# Patient Record
Sex: MALE | Race: WHITE | NOT HISPANIC OR LATINO | Employment: FULL TIME | URBAN - METROPOLITAN AREA
[De-identification: names, ages, dates, MRNs, and addresses within clinical notes are randomized per-mention and may not be internally consistent; named-entity substitution may affect disease eponyms.]

---

## 2019-04-11 ENCOUNTER — OFFICE VISIT (OUTPATIENT)
Dept: URGENT CARE | Facility: CLINIC | Age: 29
End: 2019-04-11
Payer: COMMERCIAL

## 2019-04-11 VITALS
RESPIRATION RATE: 16 BRPM | TEMPERATURE: 97.8 F | HEART RATE: 102 BPM | DIASTOLIC BLOOD PRESSURE: 69 MMHG | OXYGEN SATURATION: 96 % | SYSTOLIC BLOOD PRESSURE: 129 MMHG | WEIGHT: 261 LBS | HEIGHT: 70 IN | BODY MASS INDEX: 37.37 KG/M2

## 2019-04-11 DIAGNOSIS — J68.3 REACTIVE AIRWAYS DYSFUNCTION SYNDROME WITHOUT COMPLICATION (HCC): Primary | ICD-10-CM

## 2019-04-11 DIAGNOSIS — R09.82 POSTNASAL DRIP: ICD-10-CM

## 2019-04-11 PROCEDURE — 99213 OFFICE O/P EST LOW 20 MIN: CPT | Performed by: FAMILY MEDICINE

## 2019-04-11 RX ORDER — MOMETASONE FUROATE 50 UG/1
1 SPRAY, METERED NASAL 2 TIMES DAILY
Qty: 17 G | Refills: 0 | Status: SHIPPED | OUTPATIENT
Start: 2019-04-11

## 2019-04-11 RX ORDER — MONTELUKAST SODIUM 10 MG/1
10 TABLET ORAL
Qty: 15 TABLET | Refills: 1 | Status: SHIPPED | OUTPATIENT
Start: 2019-04-11

## 2020-09-18 ENCOUNTER — OFFICE VISIT (OUTPATIENT)
Dept: URGENT CARE | Facility: CLINIC | Age: 30
End: 2020-09-18
Payer: COMMERCIAL

## 2020-09-18 VITALS
SYSTOLIC BLOOD PRESSURE: 126 MMHG | HEART RATE: 74 BPM | RESPIRATION RATE: 18 BRPM | BODY MASS INDEX: 31.5 KG/M2 | HEIGHT: 71 IN | OXYGEN SATURATION: 98 % | WEIGHT: 225 LBS | TEMPERATURE: 97 F | DIASTOLIC BLOOD PRESSURE: 80 MMHG

## 2020-09-18 DIAGNOSIS — B37.89 CANDIDA RASH OF GROIN: Primary | ICD-10-CM

## 2020-09-18 PROCEDURE — 99213 OFFICE O/P EST LOW 20 MIN: CPT | Performed by: FAMILY MEDICINE

## 2020-09-18 RX ORDER — CLOTRIMAZOLE AND BETAMETHASONE DIPROPIONATE 10; .64 MG/G; MG/G
CREAM TOPICAL 2 TIMES DAILY
Qty: 45 G | Refills: 2 | Status: SHIPPED | OUTPATIENT
Start: 2020-09-18

## 2020-09-18 NOTE — PROGRESS NOTES
[facility]        NAME: Jessica Mota is a 31 y o  male  : 1990    MRN: [id number]  DATE: 2020  TIME: 12:29 PM    Assessment and Plan   Candida rash of groin [B37 89]  1  Candida rash of groin  clotrimazole-betamethasone (LOTRISONE) 1-0 05 % cream         Patient Instructions       Follow up with PCP in 3-5 days  Proceed to  ER if symptoms worsen  Chief Complaint     Chief Complaint   Patient presents with    Rash     between leg, he thinks its a heat rash? Had it for a month         History of Present Illness       Patient is a 56-year-old male presenting with a 1-2 month history of rash in the groin  He noticed increased redness, itchiness in the groin region during periods of increased sweating in his groin region such as running and prolonged walking at his job  He has tried over-the-counter Desitin which provided minimal relief  Denies any fevers or chills  Review of Systems   Review of Systems   Skin: Positive for color change and rash  Current Medications       Current Outpatient Medications:     clotrimazole-betamethasone (LOTRISONE) 1-0 05 % cream, Apply topically 2 (two) times a day, Disp: 45 g, Rfl: 2    mometasone (NASONEX) 50 mcg/act nasal spray, 1 spray into each nostril 2 (two) times a day (Patient not taking: Reported on 2020), Disp: 17 g, Rfl: 0    montelukast (SINGULAIR) 10 mg tablet, Take 1 tablet (10 mg total) by mouth daily at bedtime (Patient not taking: Reported on 2020), Disp: 15 tablet, Rfl: 1    Current Allergies     Allergies as of 2020    (No Known Allergies)            The following portions of the patient's history were reviewed and updated as appropriate: allergies, current medications, past family history, past medical history, past social history, past surgical history and problem list      No past medical history on file  No past surgical history on file  No family history on file        Medications have been verified  Objective   /80   Pulse 74   Temp (!) 97 °F (36 1 °C)   Resp 18   Ht 5' 11" (1 803 m)   Wt 102 kg (225 lb)   SpO2 98%   BMI 31 38 kg/m²        Physical Exam     Physical Exam  HENT:      Head: Normocephalic  Cardiovascular:      Rate and Rhythm: Normal rate  Pulmonary:      Effort: Pulmonary effort is normal    Skin:     General: Skin is warm  Findings: Erythema and rash present  Rash is macular and scaling  Rash is not vesicular  Neurological:      Mental Status: He is alert

## 2021-04-01 DIAGNOSIS — Z23 ENCOUNTER FOR IMMUNIZATION: ICD-10-CM

## 2021-04-17 ENCOUNTER — IMMUNIZATIONS (OUTPATIENT)
Dept: FAMILY MEDICINE CLINIC | Facility: HOSPITAL | Age: 31
End: 2021-04-17

## 2021-04-17 DIAGNOSIS — Z23 ENCOUNTER FOR IMMUNIZATION: Primary | ICD-10-CM

## 2021-04-17 PROCEDURE — 91300 SARS-COV-2 / COVID-19 MRNA VACCINE (PFIZER-BIONTECH) 30 MCG: CPT

## 2021-04-17 PROCEDURE — 0001A SARS-COV-2 / COVID-19 MRNA VACCINE (PFIZER-BIONTECH) 30 MCG: CPT

## 2021-05-13 ENCOUNTER — IMMUNIZATIONS (OUTPATIENT)
Dept: FAMILY MEDICINE CLINIC | Facility: HOSPITAL | Age: 31
End: 2021-05-13

## 2021-05-13 DIAGNOSIS — Z23 ENCOUNTER FOR IMMUNIZATION: Primary | ICD-10-CM

## 2021-05-13 PROCEDURE — 91300 SARS-COV-2 / COVID-19 MRNA VACCINE (PFIZER-BIONTECH) 30 MCG: CPT

## 2021-05-13 PROCEDURE — 0002A SARS-COV-2 / COVID-19 MRNA VACCINE (PFIZER-BIONTECH) 30 MCG: CPT

## 2022-01-11 ENCOUNTER — NURSE TRIAGE (OUTPATIENT)
Dept: OTHER | Facility: OTHER | Age: 32
End: 2022-01-11

## 2022-01-11 DIAGNOSIS — Z20.822 ENCOUNTER FOR SCREENING LABORATORY TESTING FOR COVID-19 VIRUS: Primary | ICD-10-CM

## 2022-01-11 PROCEDURE — U0005 INFEC AGEN DETEC AMPLI PROBE: HCPCS | Performed by: FAMILY MEDICINE

## 2022-01-11 PROCEDURE — U0003 INFECTIOUS AGENT DETECTION BY NUCLEIC ACID (DNA OR RNA); SEVERE ACUTE RESPIRATORY SYNDROME CORONAVIRUS 2 (SARS-COV-2) (CORONAVIRUS DISEASE [COVID-19]), AMPLIFIED PROBE TECHNIQUE, MAKING USE OF HIGH THROUGHPUT TECHNOLOGIES AS DESCRIBED BY CMS-2020-01-R: HCPCS | Performed by: FAMILY MEDICINE

## 2022-01-11 NOTE — TELEPHONE ENCOUNTER
Patient is requesting a covid test and does not have a [facility] PCP  Order placed  Patient informed of [facility] testing site and was advised of hours of operation, address, to wear a mask, and to stay in the car  Patient verbalized understanding  Patient already has a My Chart account to check for results  Advised patient to begin checking in 3-5 days after testing is completed  Reason for Disposition   [1] COVID-19 infection suspected by caller or triager AND [2] mild symptoms (cough, fever, or others) AND [3] has not gotten tested yet    Answer Assessment - Initial Assessment Questions  Were you within 6 feet or less, for up to 15 minutes or more with a person that has a confirmed COVID-19 test? Unknown    What was the date of your exposure? Unknown    Are you experiencing any symptoms attributed to the virus?  (Assess for SOB, cough, fever, difficulty breathing) Chills, nasal congestion, fatigue, chest tightness subsided this morning; denies shortness of breath or chest pain,  Fever with night sweats and chills, diarrhea 1/10, body aches    HIGH RISK: Do you have any history heart or lung conditions, weakened immune system, diabetes, Asthma, CHF, HIV, COPD, Chemo, renal failure, sickle cell, etc? Denies    PREGNANCY: Are you pregnant or did you recently give birth?  N/A    VACCINE: "Have you gotten the COVID-19 vaccine?" If Yes ask: "Which one, how many shots, when did you get it?" Yes, Pfizer x 2 shots    Protocols used: CORONAVIRUS (COVID-19) DIAGNOSED OR SUSPECTED-ADULT-[place]

## 2022-01-11 NOTE — TELEPHONE ENCOUNTER
Regarding: covid symptomatic - chills, tightness in chest, congestion  ----- Message from Lynne Bashir sent at 1/11/2022  1:26 PM EST -----  "I have chills, congestion, fatigue, and tightness in chest "

## 2022-06-15 ENCOUNTER — OFFICE VISIT (OUTPATIENT)
Dept: FAMILY MEDICINE CLINIC | Facility: CLINIC | Age: 32
End: 2022-06-15
Payer: COMMERCIAL

## 2022-06-15 VITALS
SYSTOLIC BLOOD PRESSURE: 118 MMHG | TEMPERATURE: 97.4 F | DIASTOLIC BLOOD PRESSURE: 80 MMHG | HEART RATE: 81 BPM | WEIGHT: 255.2 LBS | RESPIRATION RATE: 18 BRPM | HEIGHT: 72 IN | OXYGEN SATURATION: 97 % | BODY MASS INDEX: 34.57 KG/M2

## 2022-06-15 DIAGNOSIS — B07.9 VERRUCA VULGARIS: Primary | ICD-10-CM

## 2022-06-15 PROCEDURE — 3725F SCREEN DEPRESSION PERFORMED: CPT | Performed by: FAMILY MEDICINE

## 2022-06-15 PROCEDURE — 3008F BODY MASS INDEX DOCD: CPT | Performed by: FAMILY MEDICINE

## 2022-06-15 PROCEDURE — 17110 DESTRUCTION B9 LES UP TO 14: CPT | Performed by: FAMILY MEDICINE

## 2022-06-15 PROCEDURE — 99204 OFFICE O/P NEW MOD 45 MIN: CPT | Performed by: FAMILY MEDICINE

## 2022-06-15 PROCEDURE — 1036F TOBACCO NON-USER: CPT | Performed by: FAMILY MEDICINE

## 2022-06-15 NOTE — PROGRESS NOTES
Assessment/Plan:   1  Verruca vulgaris   patient tolerated cryotherapy very well for his course  He was instructed on proper wound care  At this time, he was advised to follow up in 2-3 weeks if he still notices persistence of any warts  We will retreat at that time  - Lesion Destruction           There are no diagnoses linked to this encounter  Subjective:       Chief Complaint   Patient presents with    Plantar Warts     Both feet ongoing       Patient ID: Anna Olivares is a 34 y o  male  Consider as a new patient to establish care  He has a CC today of plantar warts on both feet  He has had these for the past year  He states that his symptoms were much more extensive in the winter time  He did self treat with salicylic acid  He states that this was effective however he still feels persistence of warts on his plantar feet bilaterally as well as on his right ankle  HPI    Review of Systems   Constitutional: Negative for activity change, chills, fatigue and fever  HENT: Negative for congestion, ear pain, sinus pressure and sore throat  Eyes: Negative for redness, itching and visual disturbance  Respiratory: Negative for cough and shortness of breath  Cardiovascular: Negative for chest pain and palpitations  Gastrointestinal: Negative for abdominal pain, diarrhea and nausea  Endocrine: Negative for cold intolerance and heat intolerance  Genitourinary: Negative for dysuria, flank pain and frequency  Musculoskeletal: Negative for arthralgias, back pain, gait problem and myalgias  Skin: Positive for rash  Negative for color change  Allergic/Immunologic: Negative for environmental allergies  Neurological: Negative for dizziness, numbness and headaches  Psychiatric/Behavioral: Negative for behavioral problems and sleep disturbance         The following portions of the patient's history were reviewed and updated as appropriate : past family history, past medical history, past social history and past surgical history  Current Outpatient Medications:     clotrimazole-betamethasone (LOTRISONE) 1-0 05 % cream, Apply topically 2 (two) times a day (Patient not taking: Reported on 6/15/2022), Disp: 45 g, Rfl: 2    mometasone (NASONEX) 50 mcg/act nasal spray, 1 spray into each nostril 2 (two) times a day (Patient not taking: No sig reported), Disp: 17 g, Rfl: 0    montelukast (SINGULAIR) 10 mg tablet, Take 1 tablet (10 mg total) by mouth daily at bedtime (Patient not taking: No sig reported), Disp: 15 tablet, Rfl: 1         Objective:         Vitals:    06/15/22 1032   BP: 118/80   BP Location: Left arm   Patient Position: Sitting   Cuff Size: Large   Pulse: 81   Resp: 18   Temp: (!) 97 4 °F (36 3 °C)   TempSrc: Tympanic   SpO2: 97%   Weight: 116 kg (255 lb 3 2 oz)   Height: 5' 11 5" (1 816 m)     Physical Exam  Vitals reviewed  Constitutional:       Appearance: He is well-developed  HENT:      Head: Normocephalic and atraumatic  Nose: Nose normal       Mouth/Throat:      Pharynx: No oropharyngeal exudate  Eyes:      General: No scleral icterus  Right eye: No discharge  Left eye: No discharge  Pupils: Pupils are equal, round, and reactive to light  Neck:      Trachea: No tracheal deviation  Cardiovascular:      Rate and Rhythm: Normal rate and regular rhythm  Pulses:           Dorsalis pedis pulses are 2+ on the right side and 2+ on the left side  Posterior tibial pulses are 2+ on the right side and 2+ on the left side  Heart sounds: Normal heart sounds  No murmur heard  No friction rub  No gallop  Pulmonary:      Effort: Pulmonary effort is normal  No respiratory distress  Breath sounds: Normal breath sounds  No wheezing or rales  Abdominal:      General: Bowel sounds are normal  There is no distension  Palpations: Abdomen is soft  Tenderness: There is no abdominal tenderness  There is no guarding or rebound  Musculoskeletal:         General: Normal range of motion  Cervical back: Normal range of motion and neck supple  Feet:    Feet:      Comments:   Multiple vertical cared on plantar portion of feet bilaterally nontender on palpation  Lymphadenopathy:      Head:      Right side of head: No submental or submandibular adenopathy  Left side of head: No submental or submandibular adenopathy  Cervical: No cervical adenopathy  Right cervical: No superficial, deep or posterior cervical adenopathy  Left cervical: No superficial, deep or posterior cervical adenopathy  Skin:     General: Skin is warm and dry  Findings: No erythema  Neurological:      Mental Status: He is alert and oriented to person, place, and time  Cranial Nerves: No cranial nerve deficit  Sensory: No sensory deficit  Psychiatric:         Mood and Affect: Mood is not anxious or depressed  Speech: Speech normal          Behavior: Behavior normal          Thought Content: Thought content normal          Judgment: Judgment normal          Lesion Destruction    Date/Time: 6/15/2022 11:01 AM  Performed by: Jesus Ellison DO  Authorized by: Jesus Ellison DO   Universal Protocol:  Consent: Verbal consent obtained  Written consent not obtained    Risks and benefits: risks, benefits and alternatives were discussed  Consent given by: patient  Patient understanding: patient states understanding of the procedure being performed  Patient identity confirmed: verbally with patient      Procedure Details - Lesion Destruction:     Number of Lesions:  3  Lesion 1:     Body area:  Lower extremity    Lower extremity location:  R ankle    Initial size (mm):  6    Final defect size (mm):  6    Malignancy: benign lesion      Destruction method: cryotherapy    Lesion 2:     Body area:  Lower extremity    Lower extremity location:  R foot    Initial size (mm):  2    Final defect size (mm):  2    Malignancy: benign lesion Destruction method: cryotherapy    Lesion 3:     Body area:  Lower extremity    Lower extremity location:  L foot    Initial size (mm):  4    Malignancy: benign lesion      Destruction method: cryotherapy

## 2024-05-30 ENCOUNTER — TELEPHONE (OUTPATIENT)
Dept: FAMILY MEDICINE CLINIC | Facility: CLINIC | Age: 34
End: 2024-05-30

## 2024-05-30 NOTE — TELEPHONE ENCOUNTER
Patient Attribution #1  Lvm for patient to call and confirm status as a patient and if they would like to schedule their physical.

## 2024-05-31 NOTE — TELEPHONE ENCOUNTER
Patient Attribution #2  Lvm for patient to call and confirm if he is still a patient and if he would like to schedule a physical at this time.

## 2024-06-03 NOTE — TELEPHONE ENCOUNTER
Patient Attribution #3  Lvm for patient to call and confirm if he is still a patient and schedule a physical.   No significant past surgical history